# Patient Record
Sex: FEMALE | Race: WHITE
[De-identification: names, ages, dates, MRNs, and addresses within clinical notes are randomized per-mention and may not be internally consistent; named-entity substitution may affect disease eponyms.]

---

## 2017-12-14 ENCOUNTER — HOSPITAL ENCOUNTER (OUTPATIENT)
Dept: HOSPITAL 80 - BRMIMAGING | Age: 42
End: 2017-12-14
Attending: FAMILY MEDICINE
Payer: COMMERCIAL

## 2017-12-14 DIAGNOSIS — S99.921A: Primary | ICD-10-CM

## 2018-07-23 ENCOUNTER — HOSPITAL ENCOUNTER (EMERGENCY)
Dept: HOSPITAL 80 - FED | Age: 43
Discharge: HOME | End: 2018-07-23
Payer: COMMERCIAL

## 2018-07-23 VITALS — SYSTOLIC BLOOD PRESSURE: 95 MMHG | DIASTOLIC BLOOD PRESSURE: 70 MMHG

## 2018-07-23 DIAGNOSIS — F17.210: ICD-10-CM

## 2018-07-23 DIAGNOSIS — Z90.710: ICD-10-CM

## 2018-07-23 DIAGNOSIS — E86.9: ICD-10-CM

## 2018-07-23 DIAGNOSIS — R10.9: Primary | ICD-10-CM

## 2018-07-23 DIAGNOSIS — Z90.89: ICD-10-CM

## 2018-07-23 LAB — PLATELET # BLD: 315 10^3/UL (ref 150–400)

## 2018-07-23 NOTE — EDPHY
H & P


Time Seen by Provider: 07/23/18 00:06


HPI/ROS: 





HPI





CHIEF COMPLAINT:  Right-sided flank abdominal pain.





HISTORY OF PRESENT ILLNESS:  42-year-old female, presents emergency room with 

right flank right abdominal pain.  She states 2 Wednesdays ago she was 

diagnosed with a 4 mm kidney stone she saw Dr. Winters with Urology.  She was 

given prescriptions for Toradol, oxycodone, Zofran and Flomax.  She states the 

stone was high up in her general ureteral track she now has pain in her right 

lower abdomen radiating down into her right suprapubic region.  Associated 

nausea but no vomiting.  Denies any fever.  Patient states that the pain got 

rather severe tonight she could not tolerate it so she decided come the 

emergency room.  She denies any fever, denies active vomiting, denies any 

burning when she urinates.





Past Medical History:  Extensive ovarian cyst history, anxiety, takes Xanax and 

clonazepam





Past Surgical History:  Total hysterectomy, appendectomy multiple ovarian cyst 

surgeries





Social History:  Denies daily use drugs, smokes tobacco daily 2 cigarettes per 

day.





Family History:  Noncontributory








ROS   


REVIEW OF SYSTEMS:


A comprehensive 10 point review of systems is otherwise negative aside from 

elements mentioned in the history of present illness.








Exam   


Constitutional  nontoxic.  No acute distress  triage nursing summary reviewed, 

vital signs reviewed, awake/alert. 


Eyes   normal conjunctivae and sclera, EOMI, PERRLA. 


HENT   normal inspection, atraumatic, moist mucus membranes, no epistaxis, neck 

supple/ no meningismus, no raccoon eyes. 


Respiratory   clear to auscultation bilaterally, normal breath sounds, no 

respiratory distress, no wheezing. 


Cardiovascular   rate normal, regular rhythm, no murmur, no edema, distal 

pulses normal. 


Gastrointestinal  cannot elicit any significant tenderness on exam soft, non-

tender, no rebound, no guarding, normal bowel sounds, no distension, no 

pulsatile mass. 


Genitourinary   no CVA tenderness. 


Musculoskeletal  no midline vertebral tenderness, full range of motion, no calf 

swelling, no tenderness of extremities, no meningismus, good pulses, 

neurovascularly intact.


Skin   pink, warm, & dry, no rash, skin atraumatic. 


Neurologic   awake, alert and oriented x 3, AAOx3, moves all 4 extremities 

equally, motor intact, sensory intact, CN II-XII intact, normal cerebellar, 

normal vision, normal speech. 


Psychiatric   normal mood/affect. 


Heme/Lymph/Immune   no lymphadenopathy.





Differential Diagnosis:  Includes but is not limited to in a particular order 

urinary tract infection, infected kidney stone, pain cause from stone movement, 

hydroureter hydronephrosis





Medical Decision Making:  Plan for this patient is requesting IV pain medicine 

for pain control here in emergency room.  He had IV will be established 

received IV fluids will check basic blood work, urinalysis, IV Dilaudid for 

pain control and re-evaluate.





Re-evaluation:








09818:  Multiple doses of pain medicine IV narcotics were given in emergency 

for acute pain control the right flank pain.  However after on further re-

evaluation she complains that it did not really help.  I did look her up on


COPDMP:  Extensive list over 52 prescriptions for narcotics and benzos.  Most 

recently she was given 25 tabs of oxycodone on July 19th, it is now July 23rd.


Additionally was given 20 tabs of oxycodone on July 16th.


Additionally there was another 30 tabs given on July 13








Filled   ID   Written   Drug   QTY   Days   Prescriber   Rx #   Pharmacy *   

Refills   Daily Dose   Pymt Type   


07/19/2018   1   07/19/2018   


OXYCODONE HCL 5 MG TABLET


25.0   6   ST TAY   [id number]   [facility] ([id number])   0   31.25 MME   [facility]   [place]


07/16/2018   1   07/16/2018   


OXYCODONE HCL 5 MG TABLET


20.0   2   AYALA BONNIE   [id number]   [facility] (0010)   0   75.0 MME   [place]   [place]


07/13/2018   1   07/13/2018   


OXYCODONE HCL 5 MG TABLET


30.0   5   AYALA BONNIE   [id number]   [facility] (0010)   0   45.0 MME   [facility]   [place]


07/13/2018   1   05/08/2018   


ALPRAZOLAM 1 MG TABLET


60.0   20   SE LOTUS   [id number]   [facility] (0010)   2      [place]   [place]


07/12/2018   1   07/11/2018   


OXYCODONE HCL 5 MG TABLET


10.0   2   DA LEV   [id number]   [facility] (0010)   0   37.5 MME   [employer] [facility]   [place]


06/24/2018   1   05/08/2018   


CLONAZEPAM 0.5 MG TABLET


30.0   15   SE LOTUS   [id number]   [facility] (0010)   1      [place]   [place]


06/22/2018   1   05/08/2018   


ALPRAZOLAM 1 MG TABLET


60.0   20   SE LOTUS   [id number]   [facility] (0010)   1      [employer]   [place]











Additionally please see [place] PDMP list she has over 52 prescriptions 

prescribed by 9 prescribers 3 pharmacies.  Extensive list of narcotics, 

clonazepam, Xanax, and oxycodone.








0330:  [facility] and I 

explained that I cannot prescribe her any further narcotics.





Here in the emergency room her ultrasound did not reveal kidney stone or 

hydroureter.





I do recommend she follows up with Urology for ongoing flank pain additionally 

follow up with her primary care doctor.





Her urinalysis I did send a culture.  Will prescribe Keflex and peridium.





Return precautions discussed with her.





Additionally the ultrasound showed liver cyst that need for further evaluation 

on outpatient basis which I discussed with her however she states that she 

already knows about these and has had numerous CT scans and has had liver cyst 

since a young age.





Discussed return precautions with her.  Recommend following up with primary 

care doctor as well as Urology.





I have declined to prescribe her any narcotics.  Additionally she received 

multiple doses of Dilaudid here in the emergency room without great relief.  

However vital signs stable.  Blood work reviewed.  Ultrasound reviewed.  

Declined any further imaging.


Source: Patient


Constitutional: 


 Initial Vital Signs











Temperature (C)  36.8 C   07/23/18 00:04


 


Heart Rate  106 H  07/23/18 00:04


 


Respiratory Rate  16   07/23/18 00:04


 


Blood Pressure  109/72   07/23/18 00:04


 


O2 Sat (%)  98   07/23/18 00:04








 











O2 Delivery Mode               Room Air














Allergies/Adverse Reactions: 


 





acetaminophen [From Tylenol] Allergy (Verified 07/23/18 00:07)


 


morphine Allergy (Verified 07/23/18 00:07)


 


Penicillins Allergy (Verified 07/23/18 00:07)


 


prochlorperazine [From Compazine] Allergy (Verified 07/23/18 00:07)


 








Home Medications: 














 Medication  Instructions  Recorded


 


Cephalexin [Keflex] 500 mg PO Q6H #28 cap 07/23/18


 


Phenazopyridine HCl [Pyridium] 200 mg PO TID #15 tab 07/23/18














Medical Decision Making





- Data Points


Laboratory Results: 


 Laboratory Results





 07/23/18 00:36 





 07/23/18 00:36 





 











  07/23/18 07/23/18 07/23/18





  00:36 00:36 00:36


 


WBC      7.56 10^3/uL 10^3/uL





     (3.80-9.50) 


 


RBC      3.97 10^6/uL L 10^6/uL





     (4.18-5.33) 


 


Hgb      12.4 g/dL L g/dL





     (12.6-16.3) 


 


Hct      36.1 % L %





     (38.0-47.0) 


 


MCV      90.9 fL fL





     (81.5-99.8) 


 


MCH      31.2 pg pg





     (27.9-34.1) 


 


MCHC      34.3 g/dL g/dL





     (32.4-36.7) 


 


RDW      12.9 % %





     (11.5-15.2) 


 


Plt Count      315 10^3/uL 10^3/uL





     (150-400) 


 


MPV      9.0 fL fL





     (8.7-11.7) 


 


Neut % (Auto)      54.0 % %





     (39.3-74.2) 


 


Lymph % (Auto)      34.1 % %





     (15.0-45.0) 


 


Mono % (Auto)      6.9 % %





     (4.5-13.0) 


 


Eos % (Auto)      3.7 % %





     (0.6-7.6) 


 


Baso % (Auto)      0.8 % %





     (0.3-1.7) 


 


Nucleat RBC Rel Count      0.0 % %





     (0.0-0.2) 


 


Absolute Neuts (auto)      4.08 10^3/uL 10^3/uL





     (1.70-6.50) 


 


Absolute Lymphs (auto)      2.58 10^3/uL 10^3/uL





     (1.00-3.00) 


 


Absolute Monos (auto)      0.52 10^3/uL 10^3/uL





     (0.30-0.80) 


 


Absolute Eos (auto)      0.28 10^3/uL 10^3/uL





     (0.03-0.40) 


 


Absolute Basos (auto)      0.06 10^3/uL 10^3/uL





     (0.02-0.10) 


 


Absolute Nucleated RBC      0.00 10^3/uL 10^3/uL





     (0-0.01) 


 


Immature Gran %      0.5 % %





     (0.0-1.1) 


 


Immature Gran #      0.04 10^3/uL 10^3/uL





     (0.00-0.10) 


 


Sodium    141 mEq/L mEq/L  





    (135-145)  


 


Potassium    3.8 mEq/L mEq/L  





    (3.3-5.0)  


 


Chloride    112 mEq/L H mEq/L  





    ()  


 


Carbon Dioxide    24 mEq/l mEq/l  





    (22-31)  


 


Anion Gap    5 mEq/L L mEq/L  





    (8-16)  


 


BUN    7 mg/dL mg/dL  





    (7-23)  


 


Creatinine    0.6 mg/dL mg/dL  





    (0.6-1.0)  


 


Estimated GFR    > 60   





    


 


Glucose    104 mg/dL H mg/dL  





    ()  


 


Calcium    9.4 mg/dL mg/dL  





    (8.5-10.4)  


 


Total Bilirubin    0.4 mg/dL mg/dL  





    (0.1-1.4)  


 


Conjugated Bilirubin    0.3 mg/dL mg/dL  





    (0.0-0.5)  


 


Unconjugated Bilirubin    0.1 mg/dL mg/dL  





    (0.0-1.1)  


 


AST    17 IU/L IU/L  





    (14-46)  


 


ALT    22 IU/L IU/L  





    (9-52)  


 


Alkaline Phosphatase    57 IU/L IU/L  





    ()  


 


Total Protein    6.5 g/dL g/dL  





    (6.3-8.2)  


 


Albumin    3.9 g/dL g/dL  





    (3.5-5.0)  


 


Lipase    99 IU/L IU/L  





    ()  


 


Beta HCG, Qual  NEGATIVE     





    


 


Urine Color      





    


 


Urine Appearance      





    


 


Urine pH      





    


 


Ur Specific Gravity      





    


 


Urine Protein      





    


 


Urine Ketones      





    


 


Urine Blood      





    


 


Urine Nitrate      





    


 


Urine Bilirubin      





    


 


Urine Urobilinogen      





    


 


Ur Leukocyte Esterase      





    


 


Urine RBC      





    


 


Urine WBC      





    


 


Ur Epithelial Cells      





    


 


Urine Bacteria      





    


 


Urine Glucose      





    














  07/23/18





  00:21


 


WBC  





  


 


RBC  





  


 


Hgb  





  


 


Hct  





  


 


MCV  





  


 


MCH  





  


 


MCHC  





  


 


RDW  





  


 


Plt Count  





  


 


MPV  





  


 


Neut % (Auto)  





  


 


Lymph % (Auto)  





  


 


Mono % (Auto)  





  


 


Eos % (Auto)  





  


 


Baso % (Auto)  





  


 


Nucleat RBC Rel Count  





  


 


Absolute Neuts (auto)  





  


 


Absolute Lymphs (auto)  





  


 


Absolute Monos (auto)  





  


 


Absolute Eos (auto)  





  


 


Absolute Basos (auto)  





  


 


Absolute Nucleated RBC  





  


 


Immature Gran %  





  


 


Immature Gran #  





  


 


Sodium  





  


 


Potassium  





  


 


Chloride  





  


 


Carbon Dioxide  





  


 


Anion Gap  





  


 


BUN  





  


 


Creatinine  





  


 


Estimated GFR  





  


 


Glucose  





  


 


Calcium  





  


 


Total Bilirubin  





  


 


Conjugated Bilirubin  





  


 


Unconjugated Bilirubin  





  


 


AST  





  


 


ALT  





  


 


Alkaline Phosphatase  





  


 


Total Protein  





  


 


Albumin  





  


 


Lipase  





  


 


Beta HCG, Qual  





  


 


Urine Color  YELLOW 





  


 


Urine Appearance  CLEAR 





  


 


Urine pH  7.0 





   (5.0-7.5) 


 


Ur Specific Gravity  1.004 





   (1.002-1.030) 


 


Urine Protein  NEGATIVE 





   (NEGATIVE) 


 


Urine Ketones  NEGATIVE 





   (NEGATIVE) 


 


Urine Blood  3+  H 





   (NEGATIVE) 


 


Urine Nitrate  NEGATIVE 





   (NEGATIVE) 


 


Urine Bilirubin  NEGATIVE 





   (NEGATIVE) 


 


Urine Urobilinogen  NEGATIVE EU EU





   (0.2-1.0) 


 


Ur Leukocyte Esterase  NEGATIVE 





   (NEGATIVE) 


 


Urine RBC  25-50 /hpf H /hpf





   (0-3) 


 


Urine WBC  5-10 /hpf H /hpf





   (0-3) 


 


Ur Epithelial Cells  TRACE /lpf /lpf





   (NONE-1+) 


 


Urine Bacteria  TRACE /hpf H /hpf





   (NONE SEEN) 


 


Urine Glucose  NEGATIVE 





   (NEGATIVE) 











Medications Given: 


 








Discontinued Medications





Hydromorphone HCl (Dilaudid)  0.5 mg IVP EDNOW ONE


   Stop: 07/23/18 00:17


   Last Admin: 07/23/18 00:34 Dose:  0.5 mg


Hydromorphone HCl (Dilaudid)  0.5 mg IVP EDNOW ONE


   Stop: 07/23/18 01:22


   Last Admin: 07/23/18 01:23 Dose:  0.5 mg


Sodium Chloride (Ns)  1,000 mls @ 0 mls/hr IV EDNOW ONE; Wide Open


   PRN Reason: Protocol


   Stop: 07/23/18 00:17


   Last Admin: 07/23/18 00:34 Dose:  1,000 mls


Ceftriaxone Sodium/Dextrose (Rocephin 1 Gm (Premix))  50 mls @ 100 mls/hr IV 

EDNOW ONE


   PRN Reason: Protocol


   Stop: 07/23/18 01:28


   Last Admin: 07/23/18 01:08 Dose:  50 mls


Ketorolac Tromethamine (Toradol)  15 mg IVP EDNOW ONE


   Stop: 07/23/18 00:59


   Last Admin: 07/23/18 01:23 Dose:  Not Given


Ketorolac Tromethamine (Toradol)  15 mg IVP EDNOW ONE


   Stop: 07/23/18 02:39


   Last Admin: 07/23/18 02:41 Dose:  15 mg


Ondansetron HCl (Zofran)  4 mg IVP EDNOW ONE


   Stop: 07/23/18 00:17


   Last Admin: 07/23/18 00:34 Dose:  4 mg








Departure





- Departure


Disposition: Home, Routine, Self-Care


Clinical Impression: 


 Flank pain





Condition: Good


Instructions:  Urinary Tract Infection in Women (ED), Renal Colic (ED)


Additional Instructions: 


1. Drink lots of fluids stay well-hydrated


2. Return emergency room if you have worsening pain.  Return if he develops a 

fever vomiting or not feeling well


Referrals: 


Pradip Crain PA [Primary Care Provider] - As per Instructions


Siddhartha Winters MD [Medical Doctor] - As per Instructions


Prescriptions: 


Cephalexin [Keflex] 500 mg PO Q6H #28 cap


Phenazopyridine HCl [Pyridium] 200 mg PO TID #15 tab